# Patient Record
Sex: FEMALE | Race: OTHER | NOT HISPANIC OR LATINO
[De-identification: names, ages, dates, MRNs, and addresses within clinical notes are randomized per-mention and may not be internally consistent; named-entity substitution may affect disease eponyms.]

---

## 2021-10-12 ENCOUNTER — APPOINTMENT (OUTPATIENT)
Dept: OTOLARYNGOLOGY | Facility: CLINIC | Age: 21
End: 2021-10-12
Payer: MEDICAID

## 2021-10-12 VITALS
HEART RATE: 60 BPM | SYSTOLIC BLOOD PRESSURE: 97 MMHG | DIASTOLIC BLOOD PRESSURE: 66 MMHG | TEMPERATURE: 98.1 F | OXYGEN SATURATION: 96 %

## 2021-10-12 VITALS
DIASTOLIC BLOOD PRESSURE: 66 MMHG | HEIGHT: 67 IN | BODY MASS INDEX: 28.25 KG/M2 | WEIGHT: 180 LBS | SYSTOLIC BLOOD PRESSURE: 97 MMHG

## 2021-10-12 DIAGNOSIS — H93.13 TINNITUS, BILATERAL: ICD-10-CM

## 2021-10-12 DIAGNOSIS — H91.93 UNSPECIFIED HEARING LOSS, BILATERAL: ICD-10-CM

## 2021-10-12 PROBLEM — Z00.00 ENCOUNTER FOR PREVENTIVE HEALTH EXAMINATION: Status: ACTIVE | Noted: 2021-10-12

## 2021-10-12 PROCEDURE — 92550 TYMPANOMETRY & REFLEX THRESH: CPT

## 2021-10-12 PROCEDURE — 99202 OFFICE O/P NEW SF 15 MIN: CPT

## 2021-10-12 PROCEDURE — 92557 COMPREHENSIVE HEARING TEST: CPT

## 2021-10-13 PROBLEM — H93.13 TINNITUS OF BOTH EARS: Status: ACTIVE | Noted: 2021-10-13

## 2021-10-13 PROBLEM — H91.93 DECREASED HEARING OF BOTH EARS: Status: ACTIVE | Noted: 2021-10-13

## 2021-10-13 NOTE — HISTORY OF PRESENT ILLNESS
[de-identified] : 21 years old female patient with history of Hearing difficulties in both ears for abut one year.   Patient is present today in the office with \par \par Hearing difficulties AU for 1 yr

## 2021-10-13 NOTE — REASON FOR VISIT
[Initial Evaluation] : an initial evaluation for [FreeTextEntry2] : Hearing difficulties in both ears for abut one year.   Patient states her level of severity is a level 2 out  of 10 and it occurs constant.  Patient states nothing helps to improve or worsens her Hearing difficulties in both ears for abut one year.

## 2021-10-13 NOTE — REVIEW OF SYSTEMS
[Patient Intake Form Reviewed] : Patient intake form was reviewed [Hearing Loss] : hearing loss [Ear Noises] : ear noises [Throat Pain] : throat pain [Throat Dryness] : throat dryness [As Noted in HPI] : as noted in HPI [Negative] : Heme/Lymph [FreeTextEntry4] : Headaches  [FreeTextEntry7] : Nausea